# Patient Record
Sex: FEMALE | Race: WHITE | HISPANIC OR LATINO
[De-identification: names, ages, dates, MRNs, and addresses within clinical notes are randomized per-mention and may not be internally consistent; named-entity substitution may affect disease eponyms.]

---

## 2019-06-09 ENCOUNTER — TRANSCRIPTION ENCOUNTER (OUTPATIENT)
Age: 39
End: 2019-06-09

## 2020-12-11 ENCOUNTER — TRANSCRIPTION ENCOUNTER (OUTPATIENT)
Age: 40
End: 2020-12-11

## 2021-12-06 ENCOUNTER — EMERGENCY (EMERGENCY)
Facility: HOSPITAL | Age: 41
LOS: 0 days | Discharge: HOME | End: 2021-12-07
Attending: EMERGENCY MEDICINE | Admitting: STUDENT IN AN ORGANIZED HEALTH CARE EDUCATION/TRAINING PROGRAM
Payer: COMMERCIAL

## 2021-12-06 VITALS
DIASTOLIC BLOOD PRESSURE: 103 MMHG | SYSTOLIC BLOOD PRESSURE: 175 MMHG | WEIGHT: 126.99 LBS | HEART RATE: 92 BPM | TEMPERATURE: 98 F | OXYGEN SATURATION: 100 % | RESPIRATION RATE: 18 BRPM

## 2021-12-06 DIAGNOSIS — R07.89 OTHER CHEST PAIN: ICD-10-CM

## 2021-12-06 DIAGNOSIS — R07.9 CHEST PAIN, UNSPECIFIED: ICD-10-CM

## 2021-12-06 DIAGNOSIS — Z20.822 CONTACT WITH AND (SUSPECTED) EXPOSURE TO COVID-19: ICD-10-CM

## 2021-12-06 DIAGNOSIS — K21.9 GASTRO-ESOPHAGEAL REFLUX DISEASE WITHOUT ESOPHAGITIS: ICD-10-CM

## 2021-12-06 DIAGNOSIS — M79.644 PAIN IN RIGHT FINGER(S): ICD-10-CM

## 2021-12-06 LAB
ALBUMIN SERPL ELPH-MCNC: 4.7 G/DL — SIGNIFICANT CHANGE UP (ref 3.5–5.2)
ALP SERPL-CCNC: 55 U/L — SIGNIFICANT CHANGE UP (ref 30–115)
ALT FLD-CCNC: 14 U/L — SIGNIFICANT CHANGE UP (ref 0–41)
ANION GAP SERPL CALC-SCNC: 17 MMOL/L — HIGH (ref 7–14)
AST SERPL-CCNC: 25 U/L — SIGNIFICANT CHANGE UP (ref 0–41)
BASOPHILS # BLD AUTO: 0.04 K/UL — SIGNIFICANT CHANGE UP (ref 0–0.2)
BASOPHILS NFR BLD AUTO: 0.6 % — SIGNIFICANT CHANGE UP (ref 0–1)
BILIRUB SERPL-MCNC: <0.2 MG/DL — SIGNIFICANT CHANGE UP (ref 0.2–1.2)
BUN SERPL-MCNC: 9 MG/DL — LOW (ref 10–20)
CALCIUM SERPL-MCNC: 9.7 MG/DL — SIGNIFICANT CHANGE UP (ref 8.5–10.1)
CHLORIDE SERPL-SCNC: 101 MMOL/L — SIGNIFICANT CHANGE UP (ref 98–110)
CO2 SERPL-SCNC: 21 MMOL/L — SIGNIFICANT CHANGE UP (ref 17–32)
CREAT SERPL-MCNC: 0.6 MG/DL — LOW (ref 0.7–1.5)
EOSINOPHIL # BLD AUTO: 0.03 K/UL — SIGNIFICANT CHANGE UP (ref 0–0.7)
EOSINOPHIL NFR BLD AUTO: 0.5 % — SIGNIFICANT CHANGE UP (ref 0–8)
GLUCOSE SERPL-MCNC: 98 MG/DL — SIGNIFICANT CHANGE UP (ref 70–99)
HCT VFR BLD CALC: 39.5 % — SIGNIFICANT CHANGE UP (ref 37–47)
HGB BLD-MCNC: 13.2 G/DL — SIGNIFICANT CHANGE UP (ref 12–16)
IMM GRANULOCYTES NFR BLD AUTO: 0.2 % — SIGNIFICANT CHANGE UP (ref 0.1–0.3)
LYMPHOCYTES # BLD AUTO: 1.26 K/UL — SIGNIFICANT CHANGE UP (ref 1.2–3.4)
LYMPHOCYTES # BLD AUTO: 19.2 % — LOW (ref 20.5–51.1)
MAGNESIUM SERPL-MCNC: 2.1 MG/DL — SIGNIFICANT CHANGE UP (ref 1.8–2.4)
MCHC RBC-ENTMCNC: 29 PG — SIGNIFICANT CHANGE UP (ref 27–31)
MCHC RBC-ENTMCNC: 33.4 G/DL — SIGNIFICANT CHANGE UP (ref 32–37)
MCV RBC AUTO: 86.8 FL — SIGNIFICANT CHANGE UP (ref 81–99)
MONOCYTES # BLD AUTO: 0.4 K/UL — SIGNIFICANT CHANGE UP (ref 0.1–0.6)
MONOCYTES NFR BLD AUTO: 6.1 % — SIGNIFICANT CHANGE UP (ref 1.7–9.3)
NEUTROPHILS # BLD AUTO: 4.83 K/UL — SIGNIFICANT CHANGE UP (ref 1.4–6.5)
NEUTROPHILS NFR BLD AUTO: 73.4 % — SIGNIFICANT CHANGE UP (ref 42.2–75.2)
NRBC # BLD: 0 /100 WBCS — SIGNIFICANT CHANGE UP (ref 0–0)
NT-PROBNP SERPL-SCNC: 82 PG/ML — SIGNIFICANT CHANGE UP (ref 0–300)
PLATELET # BLD AUTO: 325 K/UL — SIGNIFICANT CHANGE UP (ref 130–400)
POTASSIUM SERPL-MCNC: 4.5 MMOL/L — SIGNIFICANT CHANGE UP (ref 3.5–5)
POTASSIUM SERPL-SCNC: 4.5 MMOL/L — SIGNIFICANT CHANGE UP (ref 3.5–5)
PROT SERPL-MCNC: 7.7 G/DL — SIGNIFICANT CHANGE UP (ref 6–8)
RBC # BLD: 4.55 M/UL — SIGNIFICANT CHANGE UP (ref 4.2–5.4)
RBC # FLD: 12.7 % — SIGNIFICANT CHANGE UP (ref 11.5–14.5)
SODIUM SERPL-SCNC: 139 MMOL/L — SIGNIFICANT CHANGE UP (ref 135–146)
TROPONIN T SERPL-MCNC: <0.01 NG/ML — SIGNIFICANT CHANGE UP
WBC # BLD: 6.57 K/UL — SIGNIFICANT CHANGE UP (ref 4.8–10.8)
WBC # FLD AUTO: 6.57 K/UL — SIGNIFICANT CHANGE UP (ref 4.8–10.8)

## 2021-12-06 PROCEDURE — 93010 ELECTROCARDIOGRAM REPORT: CPT

## 2021-12-06 PROCEDURE — 99285 EMERGENCY DEPT VISIT HI MDM: CPT

## 2021-12-06 RX ORDER — FAMOTIDINE 10 MG/ML
20 INJECTION INTRAVENOUS ONCE
Refills: 0 | Status: COMPLETED | OUTPATIENT
Start: 2021-12-06 | End: 2021-12-06

## 2021-12-06 RX ORDER — KETOROLAC TROMETHAMINE 30 MG/ML
15 SYRINGE (ML) INJECTION ONCE
Refills: 0 | Status: DISCONTINUED | OUTPATIENT
Start: 2021-12-06 | End: 2021-12-06

## 2021-12-06 RX ADMIN — FAMOTIDINE 20 MILLIGRAM(S): 10 INJECTION INTRAVENOUS at 22:23

## 2021-12-06 RX ADMIN — Medication 15 MILLIGRAM(S): at 22:23

## 2021-12-06 NOTE — ED PROVIDER NOTE - PHYSICAL EXAMINATION
Physical Exam    Vital Signs: I have reviewed the initial vital signs.  Constitutional: well-nourished, appears stated age, no acute distress  Eyes: Conjunctiva pink, Sclera clear  Cardiovascular: S1 and S2, regular rate, regular rhythm, well-perfused extremities, radial pulses equal and 2+ b/l.   Respiratory: unlabored respiratory effort, clear to auscultation bilaterally no wheezing, rales and rhonchi. pt is speaking full sentences. no accessory muscle use. no reproducible chest tenderness or chest wall crepitus.   Gastrointestinal: soft, non-tender, nondistended abdomen, no pulsatile mass, normal bowl sounds, no rebound, no guarding, no organomegaly.   Musculoskeletal: supple neck, no lower extremity edema, no calf tenderness  Integumentary: warm, dry, no rash  Neurologic: awake, alert, cranial nerves II-XII grossly intact, extremities’ motor and sensory functions grossly intact. steady gait.   Psychiatric: appropriate mood, appropriate affect

## 2021-12-06 NOTE — ED PROVIDER NOTE - CLINICAL SUMMARY MEDICAL DECISION MAKING FREE TEXT BOX
40 y.o. F with PMH of GERD resolved pw to ED for eval of left constant CP describes as soreness, + left arm tingling, no fever chills, no cough, sob, back pain, abdominal pain, nvd non smoker. Well appearing, NAD, non toxic. NCAT PERRLA EOMI neck supple non tender normal wob cta bl rrr abdomen s nt nd no rebound no guarding WWPx4 neuro non focal Will admit to obs for further workup.

## 2021-12-06 NOTE — ED PROVIDER NOTE - OBJECTIVE STATEMENT
- Hemoglobin stable ~8; Likely related to malignancy; pt hasn't had any signs of blood loss. Continue trending H/H; transfuse if <7. F: None  E: replete k<4 mg<2  N: regular diet with glucerna   DVT Ppx - Eliquis 5 mg BID   DNR/DNI  Dispo - [facility] placement - Nutrition following; will add glucerna x3/day as oral nutrition supplements. 39 y/o female with a PMH of GERD that has resolved presents to the ED for evaluation of left constant chest pain she describes as soreness that began today associated with left arm tingling. pt denies fever, chills, cough, sob, back pain, abdominal pain, n/v/d/c, rashes, cigarette smokign, illicit drug use, use of alcohol, palpitations, sweating, fhx of cad, leg pain, leg swelling, use of hormones, recent trauma, recent travel, recent surgeries, recent hospitalizations, or hx of cancer. F: None  E: replete k<4 mg<2  N: regular diet with glucerna   DVT Ppx - Heparin drip  DNR/DNI  Dispo - Pending PT eval and goals of care Palliative medicine will sign off for now. Please reconsult if the patients symptoms change and need to be reassessed, the patients goals of care need to be readdressed based on clinical changes, or if patient is readmitted in the future.

## 2021-12-06 NOTE — ED ADULT NURSE NOTE - OBJECTIVE STATEMENT
PT presented to ED c/o L sided CP radiating down her L arm starting this morning, PT denies SOB FEver. Airway patent Pt denies pain when palpated. Pt AOx4 NAD at this time. Pt on RA sat 96% PT presented to ED c/o L sided CP radiating down her L arm starting this morning, PT denies SOB FEver. Airway patent Pt  pain worsens  when palpated. Pt AOx4 NAD at this time. Pt on RA sat 96%

## 2021-12-07 VITALS
RESPIRATION RATE: 16 BRPM | TEMPERATURE: 98 F | HEART RATE: 68 BPM | OXYGEN SATURATION: 99 % | DIASTOLIC BLOOD PRESSURE: 76 MMHG | SYSTOLIC BLOOD PRESSURE: 121 MMHG

## 2021-12-07 LAB
SARS-COV-2 RNA SPEC QL NAA+PROBE: SIGNIFICANT CHANGE UP
TROPONIN T SERPL-MCNC: <0.01 NG/ML — SIGNIFICANT CHANGE UP

## 2021-12-07 PROCEDURE — 99236 HOSP IP/OBS SAME DATE HI 85: CPT

## 2021-12-07 PROCEDURE — 93010 ELECTROCARDIOGRAM REPORT: CPT

## 2021-12-07 PROCEDURE — 93971 EXTREMITY STUDY: CPT | Mod: 26,RT

## 2021-12-07 PROCEDURE — 75574 CT ANGIO HRT W/3D IMAGE: CPT | Mod: 26,MA

## 2021-12-07 PROCEDURE — 71045 X-RAY EXAM CHEST 1 VIEW: CPT | Mod: 26

## 2021-12-07 RX ORDER — METOPROLOL TARTRATE 50 MG
100 TABLET ORAL ONCE
Refills: 0 | Status: COMPLETED | OUTPATIENT
Start: 2021-12-07 | End: 2021-12-07

## 2021-12-07 RX ADMIN — Medication 50 MILLIGRAM(S): at 10:31

## 2021-12-07 NOTE — ED CDU PROVIDER DISPOSITION NOTE - CARE PROVIDERS DIRECT ADDRESSES
[email or web address]

## 2021-12-07 NOTE — ED CDU PROVIDER INITIAL DAY NOTE - MEDICAL DECISION MAKING DETAILS
jfk: 41 y/o female with a PMH of GERD here with chest pain, placed under obs for CCTA in AM. pt comfortable in AM, cp improved. also states R middle finger swollen since yesterday. no change in color, w/n/t. no swelling anyhwere else. denies hemoptysis, recent trauma, long travel or surgery , personal or FMH of unprovoked prior PE or DVT (1 cousin had clots in setting of covid she states), unilateral leg swelling,or hormone use. trop neg x2, ekg nsr with sinus arrhythmia, nonischemic, qtc 482. will get CCTA, check US of RUE with swelling. wells low so doubt PE if neg. upreg pending. disposition pending w/up and reassessment. jfk: 39 y/o female with a PMH of GERD here with chest pain, placed under obs for CCTA in AM. pt comfortable in AM, cp improved. also states R middle finger swollen since yesterday. no change in color, w/n/t. no swelling anyhwere else. denies hemoptysis, recent trauma, long travel or surgery , personal or FMH of unprovoked prior PE or DVT (1 cousin had clots in setting of covid she states), unilateral leg swelling,or hormone use. trop neg x2, ekg nsr with sinus arrhythmia, nonischemic, qtc 482. will get CCTA, check US of RUE with swelling. wells low so doubt PE if neg. upreg pending. no events on tele overnight.  disposition pending w/up and reassessment.

## 2021-12-07 NOTE — ED CDU PROVIDER DISPOSITION NOTE - NSFOLLOWUPINSTRUCTIONS_ED_ALL_ED_FT
Chest Pain  Chest pain can be caused by many different conditions. There is always a chance that your pain could be related to something serious, such as a heart attack or a blood clot in your lungs. Chest pain can also be caused by conditions that are not life-threatening. If you have chest pain, it is very important to follow up with your health care provider.    What are the causes?  Causes of this condition include:    Heartburn.  Pneumonia or bronchitis.  Anxiety or stress.  Inflammation around your heart (pericarditis) or lung (pleuritis or pleurisy).  A blood clot in your lung.  A collapsed lung (pneumothorax). This can develop suddenly on its own (spontaneous pneumothorax) or from trauma to the chest.  Shingles infection (varicella-zoster virus).  Heart attack.  Damage to the bones, muscles, and cartilage that make up your chest wall. This can include:    Bruised bones due to injury.  Strained muscles or cartilage due to frequent or repeated coughing or overwork.  Fracture to one or more ribs.  Sore cartilage due to inflammation (costochondritis).      What increases the risk?  Risk factors for this condition may include:    Activities that increase your risk for trauma or injury to your chest.  Respiratory infections or conditions that cause frequent coughing.  Medical conditions or overeating that can cause heartburn.  Heart disease or family history of heart disease.  Conditions or health behaviors that increase your risk of developing a blood clot.  Having had chicken pox (varicella zoster).    What are the signs or symptoms?  Chest pain can feel like:    Burning or tingling on the surface of your chest or deep in your chest.  Crushing, pressure, aching, or squeezing pain.  Dull or sharp pain that is worse when you move, cough, or take a deep breath.  Pain that is also felt in your back, neck, shoulder, or arm, or pain that spreads to any of these areas.    Your chest pain may come and go, or it may stay constant.    How is this diagnosed?  Lab tests or other studies may be needed to find the cause of your pain. Your health care provider may have you take a test called an ECG (electrocardiogram). An ECG records your heartbeat patterns at the time the test is performed. You may also have other tests, such as:    Transthoracic echocardiogram (TTE). In this test, sound waves are used to create a picture of the heart structures and to look at how blood flows through your heart.  Transesophageal echocardiogram (BECKY). This is a more advanced imaging test that takes images from inside your body. It allows your health care provider to see your heart in finer detail.  Cardiac monitoring. This allows your health care provider to monitor your heart rate and rhythm in real time.  Holter monitor. This is a portable device that records your heartbeat and can help to diagnose abnormal heartbeats. It allows your health care provider to track your heart activity for several days, if needed.  Stress tests. These can be done through exercise or by taking medicine that makes your heart beat more quickly.  Blood tests.  Other imaging tests.    How is this treated?  Treatment depends on what is causing your chest pain. Treatment may include:    Medicines. These may include:    Acid blockers for heartburn.  Anti-inflammatory medicine.  Pain medicine for inflammatory conditions.  Antibiotic medicine, if an infection is present.  Medicines to dissolve blood clots.  Medicines to treat coronary artery disease (CAD).    Supportive care for conditions that do not require medicines. This may include:    Resting.  Applying heat or cold packs to injured areas.  Limiting activities until pain decreases.      Follow these instructions at home:  Medicines     If you were prescribed an antibiotic, take it as told by your health care provider. Do not stop taking the antibiotic even if you start to feel better.  Take over-the-counter and prescription medicines only as told by your health care provider.  Lifestyle     Do not use any products that contain nicotine or tobacco, such as cigarettes and e-cigarettes. If you need help quitting, ask your health care provider.  Do not drink alcohol.  ImageMake lifestyle changes as directed by your health care provider. These may include:    Getting regular exercise. Ask your health care provider to suggest some activities that are safe for you.  Eating a heart-healthy diet. A registered dietitian can help you to learn healthy eating options.  Maintaining a healthy weight.  Managing diabetes, if necessary.  Reducing stress, such as with yoga or relaxation techniques.    General instructions     Avoid any activities that bring on chest pain.  If heartburn is the cause for your chest pain, raise (elevate) the head of your bed about 6 inches (15 cm) by putting blocks under the legs. Sleeping with more pillows does not effectively relieve heartburn because it only changes the position of your head.  Keep all follow-up visits as told by your health care provider. This is important. This includes any further testing if your chest pain does not go away.  Contact a health care provider if:  Your chest pain does not go away.  You have a rash with blisters on your chest.  You have a fever.  You have chills.  Get help right away if:  Your chest pain is worse.  You have a cough that gets worse, or you cough up blood.  You have severe pain in your abdomen.  You have severe weakness.  You faint.  You have sudden, unexplained chest discomfort.  You have sudden, unexplained discomfort in your arms, back, neck, or jaw.  You have shortness of breath at any time.  You suddenly start to sweat, or your skin gets clammy.  You feel nauseous or you vomit.  You suddenly feel light-headed or dizzy.  Your heart begins to beat quickly, or it feels like it is skipping beats.  These symptoms may represent a serious problem that is an emergency. Do not wait to see if the symptoms will go away. Get medical help right away. Call your local emergency services (911 in the [place] Do not drive yourself to the hospital.     This information is not intended to replace advice given to you by your health care provider. Make sure you discuss any questions you have with your health care provider. Chest Pain  Chest pain can be caused by many different conditions. There is always a chance that your pain could be related to something serious, such as a heart attack or a blood clot in your lungs. Chest pain can also be caused by conditions that are not life-threatening. If you have chest pain, it is very important to follow up with your health care provider.    What are the causes?  Causes of this condition include:    Heartburn.  Pneumonia or bronchitis.  Anxiety or stress.  Inflammation around your heart (pericarditis) or lung (pleuritis or pleurisy).  A blood clot in your lung.  A collapsed lung (pneumothorax). This can develop suddenly on its own (spontaneous pneumothorax) or from trauma to the chest.  Shingles infection (varicella-zoster virus).  Heart attack.  Damage to the bones, muscles, and cartilage that make up your chest wall. This can include:    Bruised bones due to injury.  Strained muscles or cartilage due to frequent or repeated coughing or overwork.  Fracture to one or more ribs.  Sore cartilage due to inflammation (costochondritis).      What increases the risk?  Risk factors for this condition may include:    Activities that increase your risk for trauma or injury to your chest.  Respiratory infections or conditions that cause frequent coughing.  Medical conditions or overeating that can cause heartburn.  Heart disease or family history of heart disease.  Conditions or health behaviors that increase your risk of developing a blood clot.  Having had chicken pox (varicella zoster).    What are the signs or symptoms?  Chest pain can feel like:    Burning or tingling on the surface of your chest or deep in your chest.  Crushing, pressure, aching, or squeezing pain.  Dull or sharp pain that is worse when you move, cough, or take a deep breath.  Pain that is also felt in your back, neck, shoulder, or arm, or pain that spreads to any of these areas.    Your chest pain may come and go, or it may stay constant.    How is this diagnosed?  Lab tests or other studies may be needed to find the cause of your pain. Your health care provider may have you take a test called an ECG (electrocardiogram). An ECG records your heartbeat patterns at the time the test is performed. You may also have other tests, such as:    Transthoracic echocardiogram (TTE). In this test, sound waves are used to create a picture of the heart structures and to look at how blood flows through your heart.  Transesophageal echocardiogram (BECKY). This is a more advanced imaging test that takes images from inside your body. It allows your health care provider to see your heart in finer detail.  Cardiac monitoring. This allows your health care provider to monitor your heart rate and rhythm in real time.  Holter monitor. This is a portable device that records your heartbeat and can help to diagnose abnormal heartbeats. It allows your health care provider to track your heart activity for several days, if needed.  Stress tests. These can be done through exercise or by taking medicine that makes your heart beat more quickly.  Blood tests.  Other imaging tests.    How is this treated?  Treatment depends on what is causing your chest pain. Treatment may include:    Medicines. These may include:    Acid blockers for heartburn.  Anti-inflammatory medicine.  Pain medicine for inflammatory conditions.  Antibiotic medicine, if an infection is present.  Medicines to dissolve blood clots.  Medicines to treat coronary artery disease (CAD).    Supportive care for conditions that do not require medicines. This may include:    Resting.  Applying heat or cold packs to injured areas.  Limiting activities until pain decreases.      Follow these instructions at home:  Medicines     If you were prescribed an antibiotic, take it as told by your health care provider. Do not stop taking the antibiotic even if you start to feel better.  Take over-the-counter and prescription medicines only as told by your health care provider.  Lifestyle     Do not use any products that contain nicotine or tobacco, such as cigarettes and e-cigarettes. If you need help quitting, ask your health care provider.  Do not drink alcohol.  ImageMake lifestyle changes as directed by your health care provider. These may include:    Getting regular exercise. Ask your health care provider to suggest some activities that are safe for you.  Eating a heart-healthy diet. A registered dietitian can help you to learn healthy eating options.  Maintaining a healthy weight.  Managing diabetes, if necessary.  Reducing stress, such as with yoga or relaxation techniques.    General instructions     Avoid any activities that bring on chest pain.  If heartburn is the cause for your chest pain, raise (elevate) the head of your bed about 6 inches (15 cm) by putting blocks under the legs. Sleeping with more pillows does not effectively relieve heartburn because it only changes the position of your head.  Keep all follow-up visits as told by your health care provider. This is important. This includes any further testing if your chest pain does not go away.  Contact a health care provider if:  Your chest pain does not go away.  You have a rash with blisters on your chest.  You have a fever.  You have chills.  Get help right away if:  Your chest pain is worse.  You have a cough that gets worse, or you cough up blood.  You have severe pain in your abdomen.  You have severe weakness.  You faint.  You have sudden, unexplained chest discomfort.  You have sudden, unexplained discomfort in your arms, back, neck, or jaw.  You have shortness of breath at any time.  You suddenly start to sweat, or your skin gets clammy.  You feel nauseous or you vomit.  You suddenly feel light-headed or dizzy.  Your heart begins to beat quickly, or it feels like it is skipping beats.  These symptoms may represent a serious problem that is an emergency. Do not wait to see if the symptoms will go away. Get medical help right away. Call your local emergency services (911 in the [place] Do not drive yourself to the hospital.     This information is not intended to replace advice given to you by your health care provider. Make sure you discuss any questions you have with your health care provider.    Jammed Finger    A jammed finger is an injury to the ligaments that support your finger bones. Ligaments are strong bands of tissue that connect bones and keep them in place. This injury happens when the ligaments are stretched beyond their normal range of motion (sprained).    CAUSES  A jammed finger is caused by a hard direct hit to the tip of your finger that pushes your finger toward your hand.     RISK FACTORS  This injury is more likely to happen if you play sports.    SYMPTOMS  Symptoms of a jammed finger include:    Pain.  Swelling.  Discoloration and bruising around the joint.  Difficulty bending or straightening the finger.  Not being able to use the finger normally.    DIAGNOSIS  A jammed finger is diagnosed with a medical history and physical exam. You may also have X-rays taken to check for a broken bone (fracture).     TREATMENT  Treatment for a jammed finger may include:    Wearing a splint.  Taping the injured finger to the fingers beside it (anup taping).   Medicines used to treat pain.    Depending on the type of injury, you may have to do exercises after your finger has begun to heal. This helps you regain strength and mobility in the finger.     HOME CARE INSTRUCTIONS  Take medicines only as directed by your health care provider.   Apply ice to the injured area:    Put ice in a plastic bag.    Place a towel between your skin and the bag.    Leave the ice on for 20 minutes, 2–3 times per day.   Raise the injured area above the level of your heart while you are sitting or lying down.  Wear the splint or tape as directed by your health care provider. Remove it only as directed by your health care provider.   Rest your finger until your health care provider says you can move it again. Your finger may feel stiff and painful for a while.  Perform strengthening exercises as directed by your health care provider. It may help to start doing these exercises with your hand in a bowl of warm water.  Keep all follow-up visits as directed by your health care provider. This is important.    SEEK MEDICAL CARE IF:  You have pain or swelling that is getting worse.  Your finger feels cold.  Your finger looks out of place at the joint (deformity).  You still cannot extend your finger after treatment.  You have a fever.    SEEK IMMEDIATE MEDICAL CARE IF:  Even after loosening your splint, your finger:  Is very red and swollen.  Is white or blue.  Feels tingly or becomes numb.    ADDITIONAL NOTES AND INSTRUCTIONS    Please follow up with your Primary MD in 24-48 hr.  Seek immediate medical care for any new/worsening signs or symptoms.

## 2021-12-07 NOTE — ED CDU PROVIDER INITIAL DAY NOTE - OBJECTIVE STATEMENT
39 y/o female with a PMH of GERD that has resolved presents to the ED for evaluation of left constant chest pain she describes as soreness that began today associated with left arm tingling. pt denies fever, chills, cough, sob, back pain, abdominal pain, n/v/d/c, rashes, cigarette smokign, illicit drug use, use of alcohol, palpitations, sweating, fhx of cad, leg pain, leg swelling, use of hormones, recent trauma, recent travel, recent surgeries, recent hospitalizations, or hx of cancer. pending repeat trop and ekg. ccta in the AM

## 2021-12-07 NOTE — ED CDU PROVIDER INITIAL DAY NOTE - ATTENDING CONTRIBUTION TO CARE
jfk: 39 y/o female with a PMH of GERD here with chest pain, placed under obs for CCTA in AM. pt comfortable in AM, cp improved. also states R middle finger swollen since yesterday. no change in color, w/n/t. no swelling anyhwere else. denies hemoptysis, recent trauma, long travel or surgery , personal or FMH of unprovoked prior PE or DVT (1 cousin had clots in setting of covid she states), unilateral leg swelling,or hormone use. trop neg x2, ekg nsr with sinus arrhythmia, nonischemic, qtc 482. will get CCTA, check US of RUE with swelling. wells low so doubt PE if neg. upreg pending. disposition pending w/up and reassessment.

## 2021-12-07 NOTE — ED CDU PROVIDER DISPOSITION NOTE - CARE PROVIDER_API CALL
Kareem Davis (MD)  Cardiovascular Disease; Internal Medicine; Interventional Cardiology  [address] [place] [address]  Phone: [phone]  Fax: [phone]  Follow Up Time: Urgent   Kareem Davis)  Cardiovascular Disease; Internal Medicine; Interventional Cardiology  [address] [place] [address] [address]  [place] [address]  Phone: [phone]  Fax: [phone]  Follow Up Time: Urgent    Rd Quarles)  Orthopaedic Surgery  [address] [place] [address]  Phone: [phone]  Fax: [phone]  Follow Up Time: 1-3 Days

## 2021-12-07 NOTE — ED CDU PROVIDER DISPOSITION NOTE - CLINICAL COURSE
pt with cad-rads 0. chest painsig improved and no more tightness. no sob and Us neg for DVT so with this and no sig risk factors, clincially not sig concerned for PE at this time. R hand with 1x1cm nodule like lesion to pt with cad-rads 0. chest painsig improved and no more tightness. no sob and Us neg for DVT so with this and no sig risk factors, clincially not sig concerned for PE at this time. R hand with 1x1cm nodule like lesion to dorsum of 3rd finger between pip and mcp, no redness. no ttp anywhere else or deformity. nv intact. advised to f/up with pmd for further outpt w/up and referred to hand as wlel. given nv intact, no trauma, do not feel needs further emergent w/up. Return precautions discussed for hand and cp.

## 2021-12-07 NOTE — ED CDU PROVIDER DISPOSITION NOTE - NSFOLLOWUPCLINICS_GEN_ALL_ED_FT
[facility]  Medicine  [address], [place]   Phone: [phone]  Fax:   Follow Up Time: 1-3 Days

## 2021-12-07 NOTE — ED CDU PROVIDER DISPOSITION NOTE - ATTENDING CONTRIBUTION TO CARE
I personally evaluated the patient. I reviewed the Resident’s or Physician Assistant’s note (as assigned above), and agree with the findings and plan except as documented in my note.     pt with cad-rads 0. chest painsig improved and no more tightness. no sob and Us neg for DVT so with this and no sig risk factors, clincially not sig concerned for PE at this time. R hand with 1x1cm nodule like lesion to dorsum of 3rd finger between pip and mcp, no redness. no ttp anywhere else or deformity. nv intact. advised to f/up with pmd for further outpt w/up and referred to hand as wlel. given nv intact, no trauma, do not feel needs further emergent w/up. Return precautions discussed for hand and cp.

## 2021-12-07 NOTE — ED CDU PROVIDER DISPOSITION NOTE - PATIENT PORTAL LINK FT
You can access the FollowMyHealth Patient Portal offered by [facility] by registering at the following website: [email or web address]. By joining [email or web address]’s [email or web address] portal, you will also be able to view your health information using other applications (apps) compatible with our system.

## 2021-12-07 NOTE — ED CDU PROVIDER DISPOSITION NOTE - PROVIDER TOKENS
PROVIDER:[TOKEN:[[id number]],FOLLOWUP:[Urgent]] PROVIDER:[TOKEN:[[id number]],FOLLOWUP:[Urgent]],PROVIDER:[TOKEN:[[id number]],FOLLOWUP:[1-3 Days]]

## 2021-12-30 ENCOUNTER — TRANSCRIPTION ENCOUNTER (OUTPATIENT)
Age: 41
End: 2021-12-30

## 2022-04-14 PROBLEM — Z00.00 ENCOUNTER FOR PREVENTIVE HEALTH EXAMINATION: Status: ACTIVE | Noted: 2022-04-14

## 2022-06-30 PROBLEM — Z00.00 ENCOUNTER FOR PREVENTIVE HEALTH EXAMINATION: Status: ACTIVE | Noted: 2022-06-30

## 2023-02-23 ENCOUNTER — NON-APPOINTMENT (OUTPATIENT)
Age: 43
End: 2023-02-23

## 2023-03-13 ENCOUNTER — OUTPATIENT (OUTPATIENT)
Dept: OUTPATIENT SERVICES | Facility: HOSPITAL | Age: 43
LOS: 1 days | End: 2023-03-13
Payer: COMMERCIAL

## 2023-03-13 DIAGNOSIS — R68.84 JAW PAIN: ICD-10-CM

## 2023-03-13 PROCEDURE — 70110 X-RAY EXAM OF JAW 4/> VIEWS: CPT | Mod: 26

## 2023-03-13 PROCEDURE — 70110 X-RAY EXAM OF JAW 4/> VIEWS: CPT

## 2023-03-14 DIAGNOSIS — R68.84 JAW PAIN: ICD-10-CM

## 2023-03-29 ENCOUNTER — OUTPATIENT (OUTPATIENT)
Dept: OUTPATIENT SERVICES | Facility: HOSPITAL | Age: 43
LOS: 1 days | End: 2023-03-29
Payer: COMMERCIAL

## 2023-03-29 DIAGNOSIS — M27.40 UNSPECIFIED CYST OF JAW: ICD-10-CM

## 2023-03-29 DIAGNOSIS — Z00.8 ENCOUNTER FOR OTHER GENERAL EXAMINATION: ICD-10-CM

## 2023-03-29 PROCEDURE — 76536 US EXAM OF HEAD AND NECK: CPT

## 2023-03-29 PROCEDURE — 76536 US EXAM OF HEAD AND NECK: CPT | Mod: 26

## 2023-03-30 DIAGNOSIS — M27.40 UNSPECIFIED CYST OF JAW: ICD-10-CM

## 2023-04-14 ENCOUNTER — OUTPATIENT (OUTPATIENT)
Dept: OUTPATIENT SERVICES | Facility: HOSPITAL | Age: 43
LOS: 1 days | End: 2023-04-14
Payer: COMMERCIAL

## 2023-04-14 DIAGNOSIS — Z12.31 ENCOUNTER FOR SCREENING MAMMOGRAM FOR MALIGNANT NEOPLASM OF BREAST: ICD-10-CM

## 2023-04-14 PROCEDURE — 77067 SCR MAMMO BI INCL CAD: CPT

## 2023-04-14 PROCEDURE — 77063 BREAST TOMOSYNTHESIS BI: CPT | Mod: 26

## 2023-04-14 PROCEDURE — 77063 BREAST TOMOSYNTHESIS BI: CPT

## 2023-04-14 PROCEDURE — 77067 SCR MAMMO BI INCL CAD: CPT | Mod: 26

## 2023-04-15 DIAGNOSIS — Z12.31 ENCOUNTER FOR SCREENING MAMMOGRAM FOR MALIGNANT NEOPLASM OF BREAST: ICD-10-CM

## 2023-04-20 ENCOUNTER — NON-APPOINTMENT (OUTPATIENT)
Age: 43
End: 2023-04-20

## 2023-08-24 ENCOUNTER — NON-APPOINTMENT (OUTPATIENT)
Age: 43
End: 2023-08-24

## 2023-08-25 ENCOUNTER — EMERGENCY (EMERGENCY)
Facility: HOSPITAL | Age: 43
LOS: 0 days | Discharge: HOME | End: 2023-08-25
Attending: STUDENT IN AN ORGANIZED HEALTH CARE EDUCATION/TRAINING PROGRAM
Payer: COMMERCIAL

## 2023-08-25 VITALS
RESPIRATION RATE: 18 BRPM | TEMPERATURE: 98 F | OXYGEN SATURATION: 99 % | DIASTOLIC BLOOD PRESSURE: 107 MMHG | HEART RATE: 73 BPM | SYSTOLIC BLOOD PRESSURE: 142 MMHG

## 2023-08-25 VITALS
OXYGEN SATURATION: 98 % | HEIGHT: 61 IN | TEMPERATURE: 98 F | HEART RATE: 61 BPM | WEIGHT: 123.9 LBS | DIASTOLIC BLOOD PRESSURE: 90 MMHG | SYSTOLIC BLOOD PRESSURE: 160 MMHG | RESPIRATION RATE: 18 BRPM

## 2023-08-25 DIAGNOSIS — R42 DIZZINESS AND GIDDINESS: ICD-10-CM

## 2023-08-25 DIAGNOSIS — R07.89 OTHER CHEST PAIN: ICD-10-CM

## 2023-08-25 PROBLEM — K21.9 GASTRO-ESOPHAGEAL REFLUX DISEASE WITHOUT ESOPHAGITIS: Chronic | Status: ACTIVE | Noted: 2021-12-07

## 2023-08-25 LAB
ALBUMIN SERPL ELPH-MCNC: 4.4 G/DL — SIGNIFICANT CHANGE UP (ref 3.5–5.2)
ALP SERPL-CCNC: 52 U/L — SIGNIFICANT CHANGE UP (ref 30–115)
ALT FLD-CCNC: 12 U/L — SIGNIFICANT CHANGE UP (ref 0–41)
ANION GAP SERPL CALC-SCNC: 9 MMOL/L — SIGNIFICANT CHANGE UP (ref 7–14)
AST SERPL-CCNC: 18 U/L — SIGNIFICANT CHANGE UP (ref 0–41)
BASOPHILS # BLD AUTO: 0.02 K/UL — SIGNIFICANT CHANGE UP (ref 0–0.2)
BASOPHILS NFR BLD AUTO: 0.3 % — SIGNIFICANT CHANGE UP (ref 0–1)
BILIRUB SERPL-MCNC: 0.2 MG/DL — SIGNIFICANT CHANGE UP (ref 0.2–1.2)
BUN SERPL-MCNC: 6 MG/DL — LOW (ref 10–20)
CALCIUM SERPL-MCNC: 9.3 MG/DL — SIGNIFICANT CHANGE UP (ref 8.4–10.5)
CHLORIDE SERPL-SCNC: 104 MMOL/L — SIGNIFICANT CHANGE UP (ref 98–110)
CO2 SERPL-SCNC: 27 MMOL/L — SIGNIFICANT CHANGE UP (ref 17–32)
CREAT SERPL-MCNC: 0.5 MG/DL — LOW (ref 0.7–1.5)
EGFR: 120 ML/MIN/1.73M2 — SIGNIFICANT CHANGE UP
EOSINOPHIL # BLD AUTO: 0.03 K/UL — SIGNIFICANT CHANGE UP (ref 0–0.7)
EOSINOPHIL NFR BLD AUTO: 0.5 % — SIGNIFICANT CHANGE UP (ref 0–8)
GLUCOSE SERPL-MCNC: 98 MG/DL — SIGNIFICANT CHANGE UP (ref 70–99)
HCG SERPL QL: NEGATIVE — SIGNIFICANT CHANGE UP
HCT VFR BLD CALC: 37.8 % — SIGNIFICANT CHANGE UP (ref 37–47)
HGB BLD-MCNC: 12.8 G/DL — SIGNIFICANT CHANGE UP (ref 12–16)
IMM GRANULOCYTES NFR BLD AUTO: 0.2 % — SIGNIFICANT CHANGE UP (ref 0.1–0.3)
LYMPHOCYTES # BLD AUTO: 1.6 K/UL — SIGNIFICANT CHANGE UP (ref 1.2–3.4)
LYMPHOCYTES # BLD AUTO: 27.9 % — SIGNIFICANT CHANGE UP (ref 20.5–51.1)
MAGNESIUM SERPL-MCNC: 2.1 MG/DL — SIGNIFICANT CHANGE UP (ref 1.8–2.4)
MCHC RBC-ENTMCNC: 29.3 PG — SIGNIFICANT CHANGE UP (ref 27–31)
MCHC RBC-ENTMCNC: 33.9 G/DL — SIGNIFICANT CHANGE UP (ref 32–37)
MCV RBC AUTO: 86.5 FL — SIGNIFICANT CHANGE UP (ref 81–99)
MONOCYTES # BLD AUTO: 0.31 K/UL — SIGNIFICANT CHANGE UP (ref 0.1–0.6)
MONOCYTES NFR BLD AUTO: 5.4 % — SIGNIFICANT CHANGE UP (ref 1.7–9.3)
NEUTROPHILS # BLD AUTO: 3.77 K/UL — SIGNIFICANT CHANGE UP (ref 1.4–6.5)
NEUTROPHILS NFR BLD AUTO: 65.7 % — SIGNIFICANT CHANGE UP (ref 42.2–75.2)
NRBC # BLD: 0 /100 WBCS — SIGNIFICANT CHANGE UP (ref 0–0)
NT-PROBNP SERPL-SCNC: 142 PG/ML — SIGNIFICANT CHANGE UP (ref 0–300)
PLATELET # BLD AUTO: 304 K/UL — SIGNIFICANT CHANGE UP (ref 130–400)
PMV BLD: 10.2 FL — SIGNIFICANT CHANGE UP (ref 7.4–10.4)
POTASSIUM SERPL-MCNC: 4 MMOL/L — SIGNIFICANT CHANGE UP (ref 3.5–5)
POTASSIUM SERPL-SCNC: 4 MMOL/L — SIGNIFICANT CHANGE UP (ref 3.5–5)
PROT SERPL-MCNC: 6.9 G/DL — SIGNIFICANT CHANGE UP (ref 6–8)
RBC # BLD: 4.37 M/UL — SIGNIFICANT CHANGE UP (ref 4.2–5.4)
RBC # FLD: 13.3 % — SIGNIFICANT CHANGE UP (ref 11.5–14.5)
SODIUM SERPL-SCNC: 140 MMOL/L — SIGNIFICANT CHANGE UP (ref 135–146)
TROPONIN T SERPL-MCNC: <0.01 NG/ML — SIGNIFICANT CHANGE UP
TROPONIN T SERPL-MCNC: <0.01 NG/ML — SIGNIFICANT CHANGE UP
WBC # BLD: 5.74 K/UL — SIGNIFICANT CHANGE UP (ref 4.8–10.8)
WBC # FLD AUTO: 5.74 K/UL — SIGNIFICANT CHANGE UP (ref 4.8–10.8)

## 2023-08-25 PROCEDURE — 96374 THER/PROPH/DIAG INJ IV PUSH: CPT

## 2023-08-25 PROCEDURE — 71045 X-RAY EXAM CHEST 1 VIEW: CPT | Mod: 26

## 2023-08-25 PROCEDURE — 93010 ELECTROCARDIOGRAM REPORT: CPT | Mod: 77

## 2023-08-25 PROCEDURE — 93005 ELECTROCARDIOGRAM TRACING: CPT

## 2023-08-25 PROCEDURE — 85025 COMPLETE CBC W/AUTO DIFF WBC: CPT

## 2023-08-25 PROCEDURE — 83735 ASSAY OF MAGNESIUM: CPT

## 2023-08-25 PROCEDURE — 83880 ASSAY OF NATRIURETIC PEPTIDE: CPT

## 2023-08-25 PROCEDURE — 84484 ASSAY OF TROPONIN QUANT: CPT

## 2023-08-25 PROCEDURE — 71045 X-RAY EXAM CHEST 1 VIEW: CPT

## 2023-08-25 PROCEDURE — 99285 EMERGENCY DEPT VISIT HI MDM: CPT

## 2023-08-25 PROCEDURE — 80053 COMPREHEN METABOLIC PANEL: CPT

## 2023-08-25 PROCEDURE — 93010 ELECTROCARDIOGRAM REPORT: CPT

## 2023-08-25 PROCEDURE — 99285 EMERGENCY DEPT VISIT HI MDM: CPT | Mod: 25

## 2023-08-25 PROCEDURE — 96375 TX/PRO/DX INJ NEW DRUG ADDON: CPT

## 2023-08-25 PROCEDURE — 84703 CHORIONIC GONADOTROPIN ASSAY: CPT

## 2023-08-25 PROCEDURE — 36415 COLL VENOUS BLD VENIPUNCTURE: CPT

## 2023-08-25 RX ORDER — KETOROLAC TROMETHAMINE 30 MG/ML
15 SYRINGE (ML) INJECTION ONCE
Refills: 0 | Status: DISCONTINUED | OUTPATIENT
Start: 2023-08-25 | End: 2023-08-25

## 2023-08-25 RX ORDER — FAMOTIDINE 10 MG/ML
20 INJECTION INTRAVENOUS ONCE
Refills: 0 | Status: COMPLETED | OUTPATIENT
Start: 2023-08-25 | End: 2023-08-25

## 2023-08-25 RX ADMIN — FAMOTIDINE 20 MILLIGRAM(S): 10 INJECTION INTRAVENOUS at 18:06

## 2023-08-25 RX ADMIN — Medication 15 MILLIGRAM(S): at 18:06

## 2023-08-25 NOTE — ED PROVIDER NOTE - NSFOLLOWUPCLINICS_GEN_ALL_ED_FT
[facility]  Medicine  [address], [place]   Phone: [phone]  Fax:   Follow Up Time: 4-6 Days
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
## 2023-08-25 NOTE — ED ADULT TRIAGE NOTE - CHIEF COMPLAINT QUOTE
Patient reports midsternal sharp intermittent chest pain since 1200 radiating to her left arm and jaw

## 2023-08-25 NOTE — ED PROVIDER NOTE - PATIENT PORTAL LINK FT
You can access the FollowMyHealth Patient Portal offered by [facility] by registering at the following website: [email or web address]. By joining [employer]’s FollowMyHealth portal, you will also be able to view your health information using other applications (apps) compatible with our system.

## 2023-08-25 NOTE — ED CDU PROVIDER INITIAL DAY NOTE - OBJECTIVE STATEMENT
43 yo female presents complaining of chest pain x 1 week. Patient w/ interment L sided chest pain, worsened acutely this afternoon while eating. Associated lightheadedness. Non exertional or pleuritic in nature. Denies fhx of cardiac disease, ocp use, rcnt surgery, hemoptysis, calf swelling, fever, cough, SOB.

## 2023-08-25 NOTE — ED ADULT NURSE NOTE - OBJECTIVE STATEMENT
Patient reports midsternal sharp intermittent chest pain since 1200 radiating to her left arm and jaw. pt. alert and oriented times four. vss. pt. placed on continuos cardiac and pulse ox monitoring sr.  no acute resp. distress noted labs drawn and sent. will monitor closely.

## 2023-08-25 NOTE — ED PROVIDER NOTE - CARE PROVIDERS DIRECT ADDRESSES
[email or web address]

## 2023-08-25 NOTE — ED PROVIDER NOTE - PHYSICAL EXAMINATION
CONST: Well appearing in NAD  EYES: PERRL, EOMI, Sclera and conjunctiva clear.   ENT: Oropharynx normal appearing, no erythema or exudates. Uvula midline.  NECK: Paracervical tenderness.   CARD: Normal S1 S2; Normal rate and rhythm  RESP: Equal BS B/L, No wheezes, rhonchi or rales. No distress  GI: Soft, non-tender, non-distended.  MS: Anterior chest wall tenderness. Normal ROM in all extremities.   SKIN: Warm, dry, no acute rashes. Good turgor  NEURO: A&Ox3, No focal deficits. Strength 5/5 with no sensory deficits.

## 2023-08-25 NOTE — ED PROVIDER NOTE - CARE PROVIDER_API CALL
Berkley Davis  Cardiovascular Disease  [address] [place] [address], [facility] [address]  [place] [address]  Phone: [phone]  Fax: [phone]  Follow Up Time: 4-6 Days

## 2023-08-25 NOTE — ED PROVIDER NOTE - OBJECTIVE STATEMENT
41 yo female presents complaining of chest pain x 1 week. Patient w/ interment L sided chest pain, worsened acutely this afternoon while eating. Associated lightheadedness. Non exertional or pleuritic in nature. Denies fhx of cardiac disease, ocp use, rcnt surgery, hemoptysis, calf swelling, fever, cough, SOB. 43 yo female presents complaining of chest pain x 1 week. Patient w/ intermittent L sided chest pain, worsened acutely this afternoon while eating. Associated lightheadedness. Non exertional or pleuritic in nature. Denies fhx of cardiac disease, ocp use, rcnt surgery, hemoptysis, calf swelling, fever, cough, SOB.

## 2023-08-25 NOTE — ED PROVIDER NOTE - CLINICAL SUMMARY MEDICAL DECISION MAKING FREE TEXT BOX
42-year-old female with no past medical history who presented with left-sided chest pain.  Endorses occasional radiation down the left arm.  Denies any exertional shortness of breath or chest pain.  Patient is well-appearing exam, no rash to chest, abdomen soft nontender distended.  Lungs clear to auscultation bilaterally.  Pulses intact in all 4 extremities.  EKG with no STEMI, labs including troponin negative x2. CXR independently interpreted with no focal opacities. Patient initially placed in ED OU for a CCTA however patient had a CCTA in December 2021 that showed a CAD RADS of 0.  At this time patient is stable for discharge, follow-up with cardiology recommended.  Discussed in detail with the patient.

## 2023-10-25 ENCOUNTER — NON-APPOINTMENT (OUTPATIENT)
Age: 43
End: 2023-10-25

## 2024-09-18 ENCOUNTER — NON-APPOINTMENT (OUTPATIENT)
Age: 44
End: 2024-09-18

## 2024-09-24 ENCOUNTER — NON-APPOINTMENT (OUTPATIENT)
Age: 44
End: 2024-09-24